# Patient Record
Sex: FEMALE | Race: WHITE | NOT HISPANIC OR LATINO | Employment: FULL TIME | URBAN - METROPOLITAN AREA
[De-identification: names, ages, dates, MRNs, and addresses within clinical notes are randomized per-mention and may not be internally consistent; named-entity substitution may affect disease eponyms.]

---

## 2021-07-19 ENCOUNTER — APPOINTMENT (OUTPATIENT)
Dept: CT IMAGING | Facility: HOSPITAL | Age: 46
End: 2021-07-19

## 2021-07-19 ENCOUNTER — HOSPITAL ENCOUNTER (EMERGENCY)
Facility: HOSPITAL | Age: 46
Discharge: HOME | End: 2021-07-19
Attending: EMERGENCY MEDICINE | Admitting: EMERGENCY MEDICINE

## 2021-07-19 VITALS
RESPIRATION RATE: 16 BRPM | HEART RATE: 88 BPM | TEMPERATURE: 98.4 F | DIASTOLIC BLOOD PRESSURE: 94 MMHG | OXYGEN SATURATION: 100 % | BODY MASS INDEX: 25.69 KG/M2 | SYSTOLIC BLOOD PRESSURE: 144 MMHG | HEIGHT: 63 IN | WEIGHT: 145 LBS

## 2021-07-19 DIAGNOSIS — S01.81XA FACIAL LACERATION, INITIAL ENCOUNTER: Primary | ICD-10-CM

## 2021-07-19 PROCEDURE — 99282 EMERGENCY DEPT VISIT SF MDM: CPT

## 2021-07-19 PROCEDURE — 70450 CT HEAD/BRAIN W/O DYE: CPT

## 2021-07-19 PROCEDURE — 25010000002 TDAP 5-2.5-18.5 LF-MCG/0.5 SUSPENSION: Performed by: EMERGENCY MEDICINE

## 2021-07-19 PROCEDURE — 90715 TDAP VACCINE 7 YRS/> IM: CPT | Performed by: EMERGENCY MEDICINE

## 2021-07-19 PROCEDURE — 90471 IMMUNIZATION ADMIN: CPT | Performed by: EMERGENCY MEDICINE

## 2021-07-19 RX ORDER — LIDOCAINE HYDROCHLORIDE AND EPINEPHRINE 10; 10 MG/ML; UG/ML
10 INJECTION, SOLUTION INFILTRATION; PERINEURAL ONCE
Status: COMPLETED | OUTPATIENT
Start: 2021-07-19 | End: 2021-07-19

## 2021-07-19 RX ORDER — FLUTICASONE PROPIONATE 50 MCG
2 SPRAY, SUSPENSION (ML) NASAL NIGHTLY
COMMUNITY

## 2021-07-19 RX ORDER — TIZANIDINE 4 MG/1
4 TABLET ORAL DAILY PRN
COMMUNITY

## 2021-07-19 RX ORDER — LEVOTHYROXINE SODIUM 112 UG/1
112 TABLET ORAL DAILY
COMMUNITY

## 2021-07-19 RX ORDER — LORATADINE 10 MG/1
10 CAPSULE, LIQUID FILLED ORAL NIGHTLY
COMMUNITY

## 2021-07-19 RX ADMIN — TETANUS TOXOID, REDUCED DIPHTHERIA TOXOID AND ACELLULAR PERTUSSIS VACCINE, ADSORBED 0.5 ML: 5; 2.5; 8; 8; 2.5 SUSPENSION INTRAMUSCULAR at 20:21

## 2021-07-19 RX ADMIN — LIDOCAINE HYDROCHLORIDE AND EPINEPHRINE 10 ML: 10; 10 INJECTION, SOLUTION INFILTRATION; PERINEURAL at 20:22

## 2021-07-20 NOTE — ED PROVIDER NOTES
EMERGENCY DEPARTMENT ENCOUNTER      Room Number: 12/12    History is provided by the patient, no translation services needed    HPI:    Chief complaint: Laceration    Location: Forehead    Quality/Severity: 4 out of 10    Timing/Duration: Just prior to arrival/constant    Modifying Factors: No loss of consciousness    Associated Symptoms: Headache    Narrative: Pt is a 45 y.o. female who presents complaining of a laceration to her forehead day.  Patient states just prior to arrival she got hit in the head with a golf ball.  Patient denies any loss of consciousness.  Patient is also complaining of a headache.  Patient denies any nausea or vomiting.  Patient states he is unsure of her last tetanus vaccine.      PMD: Haydee Sawant MD    REVIEW OF SYSTEMS  Review of Systems   Skin: Positive for wound.   Neurological: Positive for headaches. Negative for syncope.         PAST MEDICAL HISTORY  Active Ambulatory Problems     Diagnosis Date Noted   • No Active Ambulatory Problems     Resolved Ambulatory Problems     Diagnosis Date Noted   • No Resolved Ambulatory Problems     Past Medical History:   Diagnosis Date   • Disease of thyroid gland    • Environmental allergies        PAST SURGICAL HISTORY  Past Surgical History:   Procedure Laterality Date   • CHOLECYSTECTOMY     • HYSTERECTOMY         FAMILY HISTORY  History reviewed. No pertinent family history.    SOCIAL HISTORY  Social History     Socioeconomic History   • Marital status:      Spouse name: Not on file   • Number of children: Not on file   • Years of education: Not on file   • Highest education level: Not on file   Tobacco Use   • Smoking status: Never Smoker   • Smokeless tobacco: Never Used   Substance and Sexual Activity   • Alcohol use: Yes     Comment: occasionally   • Drug use: Never   • Sexual activity: Defer       ALLERGIES  Wound dressing adhesive    No current facility-administered medications for this encounter.    Current Outpatient  Medications:   •  fluticasone (FLONASE) 50 MCG/ACT nasal spray, 2 sprays into the nostril(s) as directed by provider Every Night., Disp: , Rfl:   •  levothyroxine (SYNTHROID, LEVOTHROID) 112 MCG tablet, Take 112 mcg by mouth Daily., Disp: , Rfl:   •  Loratadine (Claritin) 10 MG capsule, Take 10 mg by mouth Every Night., Disp: , Rfl:   •  tiZANidine (ZANAFLEX) 4 MG tablet, Take 4 mg by mouth Daily As Needed for Muscle Spasms., Disp: , Rfl:     PHYSICAL EXAM  ED Triage Vitals [07/19/21 2002]   Temp Heart Rate Resp BP SpO2   98.4 °F (36.9 °C) 88 16 144/94 100 %      Temp src Heart Rate Source Patient Position BP Location FiO2 (%)   Oral Monitor Lying Right arm --       Physical Exam  Vitals and nursing note reviewed.   HENT:      Head: Normocephalic. Laceration present.     Eyes:      Conjunctiva/sclera: Conjunctivae normal.   Cardiovascular:      Rate and Rhythm: Normal rate and regular rhythm.      Heart sounds: Normal heart sounds.   Pulmonary:      Effort: Pulmonary effort is normal. No respiratory distress.      Breath sounds: Normal breath sounds.   Abdominal:      General: Bowel sounds are normal. There is no distension.      Palpations: Abdomen is soft.      Tenderness: There is no abdominal tenderness.   Musculoskeletal:         General: Normal range of motion.      Cervical back: Normal range of motion and neck supple.   Skin:     General: Skin is warm and dry.      Findings: Laceration present.   Neurological:      Mental Status: She is alert and oriented to person, place, and time.   Psychiatric:         Mood and Affect: Mood and affect normal.           LAB RESULTS  Lab Results (last 24 hours)     ** No results found for the last 24 hours. **                RADIOLOGY  CT Head Without Contrast    Result Date: 7/19/2021  PROCEDURE: CT Head WO COMPARISON: No relevant comparison or correlation studies available at time of dictation.  INDICATIONS: hit in head with golf ball/pain, closed head injury after hit in  left for head with golf ball tonight Radiation dose reduction techniques included automated exposure control or exposure modulation based on body size.  Count of known CT and cardiac nuc med studies performed in previous 12 months: 0.  FINDINGS:  CT examination of the brain is performed without IV contrast. There is no evidence of acute intracranial hemorrhage, mass effect or midline shift. No intra-axial or extra-axial fluid collections. The ventricular system is normal.  The calvarium is intact. Scalp laceration seen in the left frontal region.     No acute intracranial trauma. Scalp laceration left frontal region.    Signer Name: Jamia Young MD  Signed: 7/19/2021 8:43 PM  Workstation Name: [facility]  [facility] ordered the above radiologic testing and reviewed the results    PROCEDURES  Procedures      PROGRESS AND CONSULTS  ED Course as of Jul 19 2121   Mon Jul 19, 2021 2117 .kss    [GT]   2117 Laceration repair:  Discussed risks to include bleeding, infection, further tissue damage, benefits to include improved wound healing, and alternatives to include no closure, altered closure techniques with patient/parents of the patient/guardian.  Expressed verbal consent for procedure.  Reassessment cm laceration located forehead.  Wound is anesthetized with 1% lidocaine with epinephrine, cleansed with chlorhexidine, and irrigated copiously.  Wound explored.  Simple single layer laceration closed with 6-0 Prolene in an interrupted fashion requiring 4 sutures.  Well-tolerated.  No immediate complications identified.  Reviewed wound care, follow-up for suture removal, and red flags which would indicate need for reevaluation of the wound.        [GT]   2120 45-year-old female presents the ED with complaints of a wound to her forehead after being hit with a golf ball in the head.  Patient denies any loss of consciousness.  Patient also complains of a headache.  After history and  physical exam patient is noted to have a 3 cm laceration to her forehead.  Patient is noted to have bleeding controlled and no foreign body seen.  Due to the fact that she was hit with golf ball a CT scan was ordered to rule out any intracranial acute disease process.  CT of the head showed no acute disease process.  Patient's wound was cleaned and closed.  Patient tolerated procedure well.  Discussed with patient that she would need to follow-up with her PCP for suture removal.  Also instructed patient if she had any worsening symptoms to return to the ED.  Patient expressed verbal understanding of plan of care.    [GT]      ED Course User Index  [GT] Kimberly Salinas PA-C           MEDICAL DECISION MAKING    MDM       DIAGNOSIS  Final diagnoses:   Facial laceration, initial encounter       Latest Documented Vital Signs:  As of 21:21 EDT  BP- 144/94 HR- 88 Temp- 98.4 °F (36.9 °C) (Oral) O2 sat- 100%    DISPOSITION  Discharged home        Discussed pertinent findings with the patient/family.  Patient/Family voiced understanding of need to follow-up for recheck and further testing as needed.  Return to the Emergency Department warnings were given.         Medication List      No changes were made to your prescriptions during this visit.             Follow-up Information     Haydee Sawant MD. Call in 1 day.    Specialty: Family Medicine  Why: To schedule a follow up appointment, For suture removal in 5 -7 days  Contact information:  [address] [place] [id number] [phone]                     Dictated utilizing Dragon dictation     Kimberly Salinas PA-C  07/19/21 [id number]